# Patient Record
Sex: FEMALE | Race: BLACK OR AFRICAN AMERICAN
[De-identification: names, ages, dates, MRNs, and addresses within clinical notes are randomized per-mention and may not be internally consistent; named-entity substitution may affect disease eponyms.]

---

## 2019-03-27 ENCOUNTER — HOSPITAL ENCOUNTER (OUTPATIENT)
Dept: HOSPITAL 62 - RAD | Age: 39
End: 2019-03-27
Attending: INTERNAL MEDICINE
Payer: OTHER GOVERNMENT

## 2019-03-27 DIAGNOSIS — Z08: Primary | ICD-10-CM

## 2019-03-27 DIAGNOSIS — Z51.11: ICD-10-CM

## 2019-03-27 PROCEDURE — 78472 GATED HEART PLANAR SINGLE: CPT

## 2019-03-27 PROCEDURE — A9560 TC99M LABELED RBC: HCPCS

## 2019-03-27 NOTE — RADIOLOGY REPORT (SQ)
EXAM DESCRIPTION:  NM MUGA REST



COMPLETED DATE/TIME:  3/27/2019 3:32 pm



REASON FOR STUDY:  ENCOUNTER FOR ANTINEOPLASTIC CHEMOTHERAPY Z51.11  ENCOUNTER FOR ANTINEOPLASTIC JOHN
MOTHERAPY Z08  ENCNTR FOR FOLLOW-UP EXAM AFTER TRTMT FOR MALIGNANT NEOP



COMPARISON:  None.



RADIONUCLIDE AND DOSE:  25 mCi technetium 99m labeled red blood cells

The route of agent administration: Intravenous



TECHNIQUE:  Following administration of the radionuclide, gated images of the heart are obtained in t
hree projections.  Left ventricular functional analysis performed.



LIMITATIONS:  None.



FINDINGS:   LEFT VENTRICULAR FUNCTION:

EJECTION FRACTION: 77%.

END-DIASTOLIC VOLUME: 105 mL.

END-SYSTOLIC VOLUME: 27 mL.

WALL MOTION: No focal wall motion abnormalities.

OTHER: No other significant finding.



IMPRESSION:  NORMAL CARDIAC MUGA STUDY.  NORMAL LEFT VENTRICULAR FUNCTION WITH VALUES AS ABOVE.



TECHNICAL DOCUMENTATION:  JOB ID:  [id number]

 2011 [employer]- All Rights Reserved



Reading location - IP/workstation name: [email or web address]

## 2019-04-01 ENCOUNTER — HOSPITAL ENCOUNTER (OUTPATIENT)
Dept: HOSPITAL 62 - CCL | Age: 39
Discharge: HOME | End: 2019-04-01
Attending: SURGERY
Payer: OTHER GOVERNMENT

## 2019-04-01 VITALS — DIASTOLIC BLOOD PRESSURE: 92 MMHG | SYSTOLIC BLOOD PRESSURE: 145 MMHG

## 2019-04-01 DIAGNOSIS — Z01.818: ICD-10-CM

## 2019-04-01 DIAGNOSIS — C50.312: Primary | ICD-10-CM

## 2019-04-01 DIAGNOSIS — D64.9: ICD-10-CM

## 2019-04-01 LAB
ANION GAP SERPL CALC-SCNC: 8 MMOL/L (ref 5–19)
BUN SERPL-MCNC: 17 MG/DL (ref 7–20)
CALCIUM: 9.7 MG/DL (ref 8.4–10.2)
CHLORIDE SERPL-SCNC: 106 MMOL/L (ref 98–107)
CO2 SERPL-SCNC: 23 MMOL/L (ref 22–30)
ERYTHROCYTE [DISTWIDTH] IN BLOOD BY AUTOMATED COUNT: 12.5 % (ref 11.5–14)
GLUCOSE SERPL-MCNC: 101 MG/DL (ref 75–110)
HCT VFR BLD CALC: 37.3 % (ref 36–47)
HGB BLD-MCNC: 12.8 G/DL (ref 12–15.5)
MCH RBC QN AUTO: 30.6 PG (ref 27–33.4)
MCHC RBC AUTO-ENTMCNC: 34.4 G/DL (ref 32–36)
MCV RBC AUTO: 89 FL (ref 80–97)
PLATELET # BLD: 312 10^3/UL (ref 150–450)
POTASSIUM SERPL-SCNC: 4 MMOL/L (ref 3.6–5)
RBC # BLD AUTO: 4.19 10^6/UL (ref 3.72–5.28)
SODIUM SERPL-SCNC: 137.2 MMOL/L (ref 137–145)
WBC # BLD AUTO: 5.3 10^3/UL (ref 4–10.5)

## 2019-04-01 PROCEDURE — C1752 CATH,HEMODIALYSIS,SHORT-TERM: HCPCS

## 2019-04-01 PROCEDURE — 36415 COLL VENOUS BLD VENIPUNCTURE: CPT

## 2019-04-01 PROCEDURE — 36561 INSERT TUNNELED CV CATH: CPT

## 2019-04-01 PROCEDURE — 76937 US GUIDE VASCULAR ACCESS: CPT

## 2019-04-01 PROCEDURE — 80048 BASIC METABOLIC PNL TOTAL CA: CPT

## 2019-04-01 PROCEDURE — 81025 URINE PREGNANCY TEST: CPT

## 2019-04-01 PROCEDURE — C1788 PORT, INDWELLING, IMP: HCPCS

## 2019-04-01 PROCEDURE — 85027 COMPLETE CBC AUTOMATED: CPT

## 2019-04-01 PROCEDURE — 77001 FLUOROGUIDE FOR VEIN DEVICE: CPT

## 2019-04-01 NOTE — OPERATIVE REPORT
Operative Report


DATE OF SURGERY: 04/01/19


PREOPERATIVE DIAGNOSIS: Left breast cancer


POSTOPERATIVE DIAGNOSIS: Left breast cancer


OPERATION: 1.  Ultrasound evaluation and real-time access in the right internal 

jugular vein.  2.  Ultrasound-guided insertion of Port-A-Cath via right internal

jugular vein.  3.  Angiogram and interpretation.


SURGEON: LENNOX WILLIAMS 1ST ASSISTANT: None.


ANESTHESIA: Moderate Sedation


TISSUE REMOVED OR ALTERED: Not applicable.


COMPLICATIONS: 





None.


ESTIMATED BLOOD LOSS: 10 mL.


INTRAOPERATIVE FINDINGS: Of a satisfactory right internal jugular vein about 1.5

cm in diameter.  Satisfactory and safe access.  Satisfactory position with the 

above the port just down in the right atrium.  Smooth flow of contrast through 

the right atrium, ventricle and pulmonary outflow tract.  Post procedure right-

sided x-ray shows satisfactory position of catheter with the tip in good 

position.  No obvious complication.

## 2019-04-01 NOTE — DISCHARGE SUMMARY
Discharge Summary (SDC)





- Discharge


Final Diagnosis: 





Left breast cancer


Date of Surgery: 04/01/19


Discharge Date: 04/01/19


Condition: Good


Treatment or Instructions: 


Discharge home [after recovery per ASU criteria].


Diet , as tolerated, when fully awake advance as tolerated. 


Activities within moderation encouraged.


Follow up in my office by appointment in about [1 week]. Call for appointment.


Leave wounds [covered], [keep clean and dry, until office visit in 1 week].  


Hold of on school/work [until evaluation in office].


Meds per med rec.  Percocet.


May shower [in 48 hrs], [try to keep operated area as dry as possible].


Prescriptions: 


Oxycodone HCl/Acetaminophen [Percocet 5-325 mg Tablet] 1 tab PO ASDIR PRN #15 

tab


 PRN Reason: 


Referrals: 


NICHOLAS ANDREW MD [Primary Care Provider] - 


Discharge Diet: As Tolerated


Respiratory Treatments at Home: Deep Breathing/Coughing


Discharge Activity: Activity As Tolerated


Report the Following to Your Physician Immediately: Shortness of Breath, Unusual

Bleeding

## 2019-04-01 NOTE — RADIOLOGY REPORT (SQ)
EXAM DESCRIPTION:  PORTACATH INSERTION



COMPLETED DATE/TIME:  4/1/2019 11:32 am



REASON FOR STUDY:  C50.312 LEFT BREAST CA C50.312  MALIG NEOPLASM OF LOWER-INNER QUADRANT OF LEFT FEM
AL



COMPARISON:  None.



FLUOROSCOPY TIME:  0.1 minute

Spot images saved to PACS.



TECHNIQUE:  Intra-operative images acquired during surgical procedure to evaluate progress.

NUMBER OF IMAGES: 17



LIMITATIONS:  None.



FINDINGS:  Fluoroscopy was provided for intraoperative procedure.  Please refer to the operative repo
rt for further discussion.



IMPRESSION:  IMAGE(S) OBTAINED DURING PROCEDURE.



COMMENT:  Quality :  Final reports for procedures using fluoroscopy that document radiation exp
osure indices, or exposure time and number of fluorographic images (if radiation exposure indices are
 not available)

Please consult full operative report of the attending physician for description of the procedure.



TECHNICAL DOCUMENTATION:  JOB ID:  [id number]

 2011 [employer]- All Rights Reserved



Reading location - IP/workstation name: [email or web address]